# Patient Record
Sex: FEMALE | Race: WHITE | Employment: UNEMPLOYED | URBAN - METROPOLITAN AREA
[De-identification: names, ages, dates, MRNs, and addresses within clinical notes are randomized per-mention and may not be internally consistent; named-entity substitution may affect disease eponyms.]

---

## 2018-02-13 ENCOUNTER — OFFICE VISIT (OUTPATIENT)
Dept: FAMILY MEDICINE | Facility: CLINIC | Age: 28
End: 2018-02-13
Payer: COMMERCIAL

## 2018-02-13 VITALS
SYSTOLIC BLOOD PRESSURE: 106 MMHG | DIASTOLIC BLOOD PRESSURE: 60 MMHG | HEART RATE: 76 BPM | HEIGHT: 63 IN | BODY MASS INDEX: 22.08 KG/M2 | WEIGHT: 124.6 LBS | TEMPERATURE: 99.2 F

## 2018-02-13 DIAGNOSIS — R07.0 THROAT PAIN: Primary | ICD-10-CM

## 2018-02-13 DIAGNOSIS — R52 ACHES: ICD-10-CM

## 2018-02-13 LAB
DEPRECATED S PYO AG THROAT QL EIA: NORMAL
FLUAV+FLUBV AG SPEC QL: NEGATIVE
FLUAV+FLUBV AG SPEC QL: NEGATIVE
SPECIMEN SOURCE: NORMAL
SPECIMEN SOURCE: NORMAL

## 2018-02-13 PROCEDURE — 87081 CULTURE SCREEN ONLY: CPT | Performed by: FAMILY MEDICINE

## 2018-02-13 PROCEDURE — 87804 INFLUENZA ASSAY W/OPTIC: CPT | Performed by: FAMILY MEDICINE

## 2018-02-13 PROCEDURE — 99203 OFFICE O/P NEW LOW 30 MIN: CPT | Performed by: FAMILY MEDICINE

## 2018-02-13 PROCEDURE — 87880 STREP A ASSAY W/OPTIC: CPT | Performed by: FAMILY MEDICINE

## 2018-02-13 NOTE — LETTER
[facility]  [id number] [place] [address]  Phone: [phone]      To Whom It May Concern,    Marlee Calvillo was seen in clinic today due to illness.  I recommend that she not work today, tomorrow or Thursday.    Sincerely,  Sandra Augustine MD

## 2018-02-13 NOTE — MR AVS SNAPSHOT
"              After Visit Summary   2018    Marlee Calvillo    MRN: [id number]           Patient Information     Date Of Birth          1990        Visit Information        Provider Department      2018 12:20 PM Sandra Augustine MD [facility]        Today's Diagnoses     Throat pain    -  1    Aches           Follow-ups after your visit        Who to contact     Normal or non-critical lab and imaging results will be communicated to you by MyChart, letter or phone within 4 business days after the clinic has received the results. If you do not hear from us within 7 days, please contact the clinic through MyChart or phone. If you have a critical or abnormal lab result, we will notify you by phone as soon as possible.  Submit refill requests through [employer] or call your pharmacy and they will forward the refill request to us. Please allow 3 business days for your refill to be completed.          If you need to speak with a  for additional information , please call: [phone]             Additional Information About Your Visit        [employer] Information     [employer] lets you send messages to your doctor, view your test results, renew your prescriptions, schedule appointments and more. To sign up, go to [email or web address] . Click on \"Log in\" on the left side of the screen, which will take you to the Welcome page. Then click on \"Sign up Now\" on the right side of the page.     You will be asked to enter the access code listed below, as well as some personal information. Please follow the directions to create your username and password.     Your access code is: [email or web address]  Expires: 5/15/2018 10:11 AM     Your access code will  in 90 days. If you need help or a new code, please call your [place] clinic or [phone].        Care EveryWhere ID     This is your Care EveryWhere ID. This could be used by other organizations to access your [place] medical " "records  [id number]        Your Vitals Were     Pulse Temperature Height BMI (Body Mass Index)          76 99.2  F (37.3  C) (Tympanic) 5' 3\" (1.6 m) 22.07 kg/m2         Blood Pressure from Last 3 Encounters:   02/13/18 106/60    Weight from Last 3 Encounters:   02/13/18 124 lb 9.6 oz (56.5 kg)              We Performed the Following     Beta strep group A culture     Influenza A/B antigen     Strep, Rapid Screen        Primary Care Provider Fax #    Physician No Ref-Primary [phone]       No address on file        Equal Access to Services     [facility]: Hadii noah Lopez, wababatunde owens, giancarlo porter, magnus bowers . So [facility] [phone].    ATENCIÓN: Si habla español, tiene a alvarez disposición servicios gratuitos de asistencia lingüística. Llame al [phone].    We comply with applicable federal civil rights laws and [place] laws. We do not discriminate on the basis of race, color, national origin, age, disability, sex, sexual orientation, or gender identity.            Thank you!     Thank you for choosing [facility]  for your care. Our goal is always to provide you with excellent care. Hearing back from our patients is one way we can continue to improve our services. Please take a few minutes to complete the written survey that you may receive in the mail after your visit with us. Thank you!             Your Updated Medication List - Protect others around you: Learn how to safely use, store and throw away your medicines at www.disposemymeds.org.      Notice  As of 2/13/2018 11:59 PM    You have not been prescribed any medications.      "

## 2018-02-13 NOTE — LETTER
February 14, 2018        Marlee Calvillo  [address] [id number]        The results of your 24 hour throat culture were negative.  Please contact your clinic if you have any questions or concerns.            Sincerely,        Sandra Augustine MD

## 2018-02-13 NOTE — PROGRESS NOTES
"SUBJECTIVE:                                                    Marlee Calvillo is a 27 year old female who presents to clinic today for the following health issues:             Symptoms: cc Present Absent Comment   Fever/Chills  x  Chills but does not check her temp   Fatigue  x     Headache  x     Muscle or Body  Aches   x    Eye Irritation  x  Watering and itching   Sneezing   x    Nasal Shyam/Drg  x     Sinus Pressure/Pain   x    Dental pain   x    Sore Throat x      Swollen Glands  x     Ear Pain/Fullness  x  Crackly    Cough   x    Wheeze   x    Chest Discomfort   x    Shortness of breath   x    Abdominal pain   x    Emesis    x    Diarrhea   x    Other   x      Symptom duration:  started 3 days ago   Symptom severity:  moderate   Treatments tried:  naproxen taken last night   Contacts:  yes         Problem list and histories reviewed & adjusted, as indicated.  Additional history:         ROS:  Constitutional, HEENT, cardiovascular, pulmonary, gi and gu systems are negative, except as otherwise noted.    OBJECTIVE:                                                    /60  Pulse 76  Temp 99.2  F (37.3  C) (Tympanic)  Ht 5' 3\" (1.6 m)  Wt 124 lb 9.6 oz (56.5 kg)  BMI 22.07 kg/m2 Body mass index is 22.07 kg/(m^2).   GENERAL: healthy, alert and no distress  EYES: Eyes grossly normal to inspection, extraocular movements - intact, and PERRL  HENT: ear canals- normal; TMs- normal; Nose- normal; Mouth- no ulcers, no lesions, minimal erythema in posterior oropharynx.  NO exudate or palatal petechiae  NECK: amild anterior cervical adenopathy and tenderness, no asymmetry, no masses, no stiffness; thyroid- normal to palpation  RESP: lungs clear to auscultation - no rales, no rhonchi, no wheezes  CV: regular rates and rhythm, normal S1 S2, no S3 or S4 and no murmur, no click or rub -  ABDOMEN: soft, no tenderness, no  hepatosplenomegaly, no masses, normal bowel sounds  MS: extremities- no gross deformities noted, no " edema  SKIN: no suspicious lesions, no rashes       Rapid strep NEG  Influenza NEG    ASSESSMENT/PLAN:                                                      ASSESSMENT/PLAN:    (R07.0) Throat pain  (primary encounter diagnosis)  Comment: viral process most likely  Plan: Strep, Rapid Screen, Beta strep group A         culture, Influenza A/B antigen    (R52) Aches  Comment: viral process.  Pt concerned about influenza and requested testing  Plan: Influenza A/B antigen       Flu test negative.      F/U in clinic/UC/ER if issues arise    Discussed this is likely viral process.  Best for her to stay at home and rest.  Note written for work.    Assessment and plan reviewed. Questions answered      Sandra Augustine MD  [facility]

## 2018-02-14 LAB
BACTERIA SPEC CULT: NORMAL
SPECIMEN SOURCE: NORMAL

## 2018-05-14 ENCOUNTER — OFFICE VISIT (OUTPATIENT)
Dept: FAMILY MEDICINE | Facility: CLINIC | Age: 28
End: 2018-05-14
Payer: COMMERCIAL

## 2018-05-14 VITALS
OXYGEN SATURATION: 98 % | WEIGHT: 152 LBS | SYSTOLIC BLOOD PRESSURE: 124 MMHG | BODY MASS INDEX: 26.93 KG/M2 | DIASTOLIC BLOOD PRESSURE: 85 MMHG

## 2018-05-14 DIAGNOSIS — R29.810 FACIAL DROOP: Primary | ICD-10-CM

## 2018-05-14 PROCEDURE — 99213 OFFICE O/P EST LOW 20 MIN: CPT | Performed by: NURSE PRACTITIONER

## 2018-05-14 NOTE — MR AVS SNAPSHOT
"              After Visit Summary   2018    Marlee Calvillo    MRN: [id number]           Patient Information     Date Of Birth          1990        Visit Information        Provider Department      2018 10:00 AM Jannet Shelby NP [facility]        Today's Diagnoses     Facial droop    -  1      Care Instructions    Pt told to go directly to ER for evaluation. Pt signed AMA against EMS transport.           Follow-ups after your visit        Follow-up notes from your care team     Return if symptoms worsen or fail to improve.      Who to contact     Normal or non-critical lab and imaging results will be communicated to you by [employer], letter or phone within 4 business days after the clinic has received the results. If you do not hear from us within 7 days, please contact the clinic through [employer] or phone. If you have a critical or abnormal lab result, we will notify you by phone as soon as possible.  Submit refill requests through [employer] or call your pharmacy and they will forward the refill request to us. Please allow 3 business days for your refill to be completed.          If you need to speak with a  for additional information , please call: [phone]             Additional Information About Your Visit        [employer] Information     [employer] lets you send messages to your doctor, view your test results, renew your prescriptions, schedule appointments and more. To sign up, go to [email or web address] . Click on \"Log in\" on the left side of the screen, which will take you to the Welcome page. Then click on \"Sign up Now\" on the right side of the page.     You will be asked to enter the access code listed below, as well as some personal information. Please follow the directions to create your username and password.     Your access code is: [email or web address]  Expires: 2018  1:02 PM     Your access code will  in 90 days. If you need help or a new code, please " call your [place] clinic or [phone].        Care EveryWhere ID     This is your Care EveryWhere ID. This could be used by other organizations to access your [place] medical records  [id number]        Your Vitals Were     Pulse Oximetry BMI (Body Mass Index)                98% 26.93 kg/m2           Blood Pressure from Last 3 Encounters:   05/14/18 124/85   02/13/18 106/60    Weight from Last 3 Encounters:   05/14/18 152 lb (68.9 kg)   02/13/18 124 lb 9.6 oz (56.5 kg)              Today, you had the following     No orders found for display       Primary Care Provider Fax #    Physician No Ref-Primary [phone]       No address on file        Equal Access to Services     [employer] [facility] : Otilio Lopez, wababatunde owens, qaizabel kaalmada german, magnus bowers . So [facility] [phone].    ATENCIÓN: Si habla español, tiene a alvarez disposición servicios gratuitos de asistencia lingüística. Llame al [phone].    We comply with applicable federal civil rights laws and [place] laws. We do not discriminate on the basis of race, color, national origin, age, disability, sex, sexual orientation, or gender identity.            Thank you!     Thank you for choosing [facility]  for your care. Our goal is always to provide you with excellent care. Hearing back from our patients is one way we can continue to improve our services. Please take a few minutes to complete the written survey that you may receive in the mail after your visit with us. Thank you!             Your Updated Medication List - Protect others around you: Learn how to safely use, store and throw away your medicines at www.disposemymeds.org.      Notice  As of 5/14/2018 11:59 PM    You have not been prescribed any medications.

## 2018-05-14 NOTE — NURSING NOTE
Chief Complaint   Patient presents with     Facial Droop     Patient walked into clinic with a facial droop. RN's triaged patient and obtained vitals. I did not see patient. Melissa Morris MA

## 2018-05-15 NOTE — PROGRESS NOTES
Marlee walked into clinic with R sided facial droop.  Never had this before.  No hx blood clots.  No chest pain or SOB. Pt very anxious.  No arm drift, Not able to close eye or smile on R.  No current illness, reports R ear pain but no infection present. No new medications, no recent vaccinations.  Discussed Bell's Palsy vs stroke.  Advised pt to go to ER via EMS transport for further evaluation; pt declining EMS transport.  Pt go ride to go to hospital, signed AMA.  VSS.  DIAN Tenorio, FNP-BC

## 2018-05-18 ENCOUNTER — OFFICE VISIT (OUTPATIENT)
Dept: FAMILY MEDICINE | Facility: CLINIC | Age: 28
End: 2018-05-18
Payer: COMMERCIAL

## 2018-05-18 VITALS
WEIGHT: 127.6 LBS | RESPIRATION RATE: 16 BRPM | BODY MASS INDEX: 21.78 KG/M2 | TEMPERATURE: 98.1 F | HEART RATE: 114 BPM | OXYGEN SATURATION: 99 % | DIASTOLIC BLOOD PRESSURE: 80 MMHG | HEIGHT: 64 IN | SYSTOLIC BLOOD PRESSURE: 118 MMHG

## 2018-05-18 DIAGNOSIS — Z09 HOSPITAL DISCHARGE FOLLOW-UP: Primary | ICD-10-CM

## 2018-05-18 DIAGNOSIS — G51.0 BELL'S PALSY: ICD-10-CM

## 2018-05-18 PROCEDURE — 99214 OFFICE O/P EST MOD 30 MIN: CPT | Performed by: NURSE PRACTITIONER

## 2018-05-18 RX ORDER — PREDNISONE 20 MG/1
TABLET ORAL
Qty: 11 TABLET | Refills: 0 | Status: SHIPPED | OUTPATIENT
Start: 2018-05-18

## 2018-05-18 RX ORDER — PREDNISONE 20 MG/1
TABLET ORAL
COMMUNITY
Start: 2018-05-14 | End: 2018-05-18

## 2018-05-18 RX ORDER — VALACYCLOVIR HYDROCHLORIDE 500 MG/1
1000 TABLET, FILM COATED ORAL
COMMUNITY
Start: 2018-05-14 | End: 2018-05-21

## 2018-05-18 NOTE — PROGRESS NOTES
"  SUBJECTIVE:   Marlee Calvillo is a 27 year old female who presents to clinic today for the following health issues:      ED/UC Followup:    Facility:  [facility]  Date of visit: 5/14/18  Reason for visit: facial droop  Current Status: discharge-bells palsy-given valtrex, prednisone       Problem list and histories reviewed & adjusted, as indicated.  Additional history: as documented    There is no problem list on file for this patient.    Past Surgical History:   Procedure Laterality Date     C/SECTION, CLASSICAL  2012 & 2006       Social History   Substance Use Topics     Smoking status: Former Smoker     Smokeless tobacco: Never Used      Comment: vape only     Alcohol use No     History reviewed. No pertinent family history.      Current Outpatient Prescriptions   Medication Sig Dispense Refill     predniSONE (DELTASONE) 20 MG tablet 2 tabs (40 mg) daily x 3 days, 1 tab (20 mg) daily x 3 days, then 1/2 tab (10 mg) x 3 days. 11 tablet 0     valACYclovir (VALTREX) 500 MG tablet Take 1,000 mg by mouth       No Known Allergies  BP Readings from Last 3 Encounters:   05/18/18 118/80   05/14/18 124/85   02/13/18 106/60    Wt Readings from Last 3 Encounters:   05/18/18 127 lb 9.6 oz (57.9 kg)   05/14/18 152 lb (68.9 kg)   02/13/18 124 lb 9.6 oz (56.5 kg)                  Labs reviewed in EPIC    Reviewed and updated as needed this visit by clinical staff  Tobacco  Allergies  Meds  Med Hx  Surg Hx  Fam Hx  Soc Hx      Reviewed and updated as needed this visit by Provider         ROS:  Constitutional, HEENT, cardiovascular, pulmonary, GI, , musculoskeletal, neuro, skin, endocrine and psych systems are negative, except as otherwise noted.    OBJECTIVE:     /80  Pulse 114  Temp 98.1  F (36.7  C) (Oral)  Resp 16  Ht 5' 4\" (1.626 m)  Wt 127 lb 9.6 oz (57.9 kg)  LMP 04/18/2018 (Approximate)  SpO2 99%  Breastfeeding? No  BMI 21.9 kg/m2  Body mass index is 21.9 kg/(m^2).  GENERAL: healthy, alert and no " distress  EYES: Eyes grossly normal to inspection, PERRL and conjunctivae and sclerae normal  RESP: lungs clear to auscultation - no rales, rhonchi or wheezes  CV: regular rate and rhythm, normal S1 S2, no S3 or S4, no murmur, click or rub, no peripheral edema and peripheral pulses strong  NEURO: Normal strength and tone, mentation intact POSITIVE still not able to fully close R eye or smile. Does not feel she is ready to go back to work yet; until further improvement is seen.     Diagnostic Test Results:  none     ASSESSMENT/PLAN:         ICD-10-CM    1. Hospital discharge follow-up Z09 predniSONE (DELTASONE) 20 MG tablet   2. Bell's palsy G51.0 predniSONE (DELTASONE) 20 MG tablet    pt took prednisone wrong, now is out of pills for taper       See Patient Instructions    Jannet Shelby, P  [facility]

## 2018-05-18 NOTE — NURSING NOTE
"Chief Complaint   Patient presents with     RECHECK       Initial /80  Pulse 114  Temp 98.1  F (36.7  C) (Oral)  Resp 16  Ht 5' 4\" (1.626 m)  Wt 127 lb 9.6 oz (57.9 kg)  LMP 04/18/2018 (Approximate)  SpO2 99%  Breastfeeding? No  BMI 21.9 kg/m2 Estimated body mass index is 21.9 kg/(m^2) as calculated from the following:    Height as of this encounter: 5' 4\" (1.626 m).    Weight as of this encounter: 127 lb 9.6 oz (57.9 kg).  Medication Reconciliation: complete     Melissa Morris MA  "

## 2018-05-18 NOTE — MR AVS SNAPSHOT
After Visit Summary   5/18/2018    Marlee Calvillo    MRN: [id number]           Patient Information     Date Of Birth          1990        Visit Information        Provider Department      5/18/2018 1:40 PM Jannet Shelby NP [facility]'s Diagnoses     Bell's palsy    -  1    Hospital discharge follow-up          Care Instructions      Lucas s Palsy    Bell's Palsy is a problem involving the nerve that controls the muscles on one side of the face.  The cause is unknown, but may be related to inflammation of the nerve. Symptoms usually appear only on the affected side. They may include:    Inability to close the eyelid    Tearing of the eye    Facial drooping    Drooling    Numbness or pain    Changes in taste    Sound sensitivity  Damage to the eye can be a serious problem. The inability to blink can cause the eye to dry out. An ulcer (sore) can then form on the cornea. Also, not blinking means that the eye has no protection from dirt and dust particles.  Treatment involves protecting and moistening the eye. Medicines may also help.  Most persons recover completely within 3 to 6 months. However, the condition sometimes returns months or years later.  Home care    Get plenty of rest and eat a healthy diet to help yourself recover.    Use Artificial Tears frequently during the day and at bedtime to prevent drying. These drops are available without prescription at your drug store.    Wear protective glasses especially when outside to protect from flying debris. Use sunglasses when outdoors.    Tape the eyelid closed at bedtime with a paper tape (available at your pharmacy). This has a very mild adhesive to avoid injury to the lid. This will protect your eye from injury while you sleep.    Sometimes medicines are prescribed to reduce inflammation or treat specific viral infections of the nerve. If medicines are prescribed, take them exactly as directed. Usually there is a  10-day course of medication that is started as soon as possible. Taking this medicine as prescribed will help with a full recovery.    Use low heat, for example from a heating pad, on the affected area. This can help reduce pain and swelling.    If you are experiencing sever pain, contact your health care provider.  Follow-up care  Follow up with your healthcare provider as advised. If you referred to a specialist, make that appointment promptly.  When to seek medical advice  Call your healthcare provider if any of the following occur:    Severe eye redness    Eye pain    Thick drainage from the eye    Change in vision (such as double vision or losing vision)    Fever over 100.4 F (38 C) or as directed by your healthcare provider    Headache, neck pain, weakness, trouble speaking or walking, or other unexplained symptoms  Date Last Reviewed: 8/23/2015 2000-2017 [employer]. [address] [place] [address]. All rights reserved. This information is not intended as a substitute for professional medical care. Always follow your healthcare professional's instructions.                Follow-ups after your visit        Follow-up notes from your care team     Return if symptoms worsen or fail to improve.      Your next 10 appointments already scheduled     May 21, 2018 10:00 AM CDT   PHYSICAL with Jannet Shelby NP   [facility] ([facility])    [address] [place] [address] [phone]              Who to contact     Normal or non-critical lab and imaging results will be communicated to you by MyChart, letter or phone within 4 business days after the clinic has received the results. If you do not hear from us within 7 days, please contact the clinic through MyChart or phone. If you have a critical or abnormal lab result, we will notify you by phone as soon as possible.  Submit refill requests through [employer] or call your pharmacy and they will  "forward the refill request to us. Please allow 3 business days for your refill to be completed.          If you need to speak with a  for additional information , please call: [phone]             Additional Information About Your Visit        [employer] Information     [employer] gives you secure access to your electronic health record. If you see a primary care provider, you can also send messages to your care team and make appointments. If you have questions, please call your primary care clinic.  If you do not have a primary care provider, please call [phone] and they will assist you.        Care EveryWhere ID     This is your Care EveryWhere ID. This could be used by other organizations to access your [place] medical records  [id number]        Your Vitals Were     Pulse Temperature Respirations Height Last Period Pulse Oximetry    114 98.1  F (36.7  C) (Oral) 16 5' 4\" (1.626 m) 04/18/2018 (Approximate) 99%    Breastfeeding? BMI (Body Mass Index)                No 21.9 kg/m2           Blood Pressure from Last 3 Encounters:   05/18/18 118/80   05/14/18 124/85   02/13/18 106/60    Weight from Last 3 Encounters:   05/18/18 127 lb 9.6 oz (57.9 kg)   05/14/18 152 lb (68.9 kg)   02/13/18 124 lb 9.6 oz (56.5 kg)              Today, you had the following     No orders found for display         Today's Medication Changes          These changes are accurate as of 5/18/18  1:45 PM.  If you have any questions, ask your nurse or doctor.               Start taking these medicines.        Dose/Directions    predniSONE 20 MG tablet   Commonly known as:  DELTASONE   Used for:  Bell's palsy, Hospital discharge follow-up   Started by:  Jannet Shelby NP        2 tabs (40 mg) daily x 3 days, 1 tab (20 mg) daily x 3 days, then 1/2 tab (10 mg) x 3 days.   Quantity:  11 tablet   Refills:  0            Where to get your medicines      These medications were sent to [facility] [address] [place], [place] " - [address] AT [address], [facility] [address]     Phone:  [phone]     predniSONE 20 MG tablet                Primary Care Provider Fax #    Physician No Ref-Primary [phone]       No address on file        Equal Access to Services     [employer] : Otilio warren ashao Soomaali, waaxda luqadaha, qaybta kaalmada adeegyada, waxnell spencer donaldanthony santosrussellbrent hollins. So [facility] [phone].    ATENCIÓN: Si habla español, tiene a alvarez disposición servicios gratuitos de asistencia lingüística. Ll[facility] [phone].    We comply with applicable federal civil rights laws and [place] laws. We do not discriminate on the basis of race, color, national origin, age, disability, sex, sexual orientation, or gender identity.            Thank you!     Thank you for choosing [facility]  for your care. Our goal is always to provide you with excellent care. Hearing back from our patients is one way we can continue to improve our services. Please take a few minutes to complete the written survey that you may receive in the mail after your visit with us. Thank you!             Your Updated Medication List - Protect others around you: Learn how to safely use, store and throw away your medicines at www.disposemymeds.org.          This list is accurate as of 5/18/18  1:45 PM.  Always use your most recent med list.                   Brand Name Dispense Instructions for use Diagnosis    predniSONE 20 MG tablet    DELTASONE    11 tablet    2 tabs (40 mg) daily x 3 days, 1 tab (20 mg) daily x 3 days, then 1/2 tab (10 mg) x 3 days.    Bell's palsy, Hospital discharge follow-up       valACYclovir 500 MG tablet    VALTREX     Take 1,000 mg by mouth

## 2018-05-18 NOTE — LETTER
[facility]  [address] [facility]  [place] [address]  [phone]          May 18, 2018    RE:  Marlee Calvillo                                                                                                                                                       [address]   [facility] [address]            To whom it may concern:    Marlee Calvillo is under my professional care, she is to remain off work through 5/25/18, and may return 5/26/18 unless directed otherwise.     Sincerely,        Jannet Shelby RN, CNP

## 2018-05-22 ENCOUNTER — HEALTH MAINTENANCE LETTER (OUTPATIENT)
Age: 28
End: 2018-05-22

## 2018-07-17 ENCOUNTER — TELEPHONE (OUTPATIENT)
Dept: FAMILY MEDICINE | Facility: CLINIC | Age: 28
End: 2018-07-17

## 2018-07-17 NOTE — TELEPHONE ENCOUNTER
Panel Management Review    Summary:    Patient is due/failing the following:   PAP and PHYSICAL    Type of outreach:    Sent [place] [place] message.    Questions for provider review:    None                                                                                                                                    Melissa Morris MA       Chart routed to Care Team .

## 2018-07-17 NOTE — LETTER
August 21, 2018      Marlee Calvillo  [address]   [facility] [address]        Dear Marlee,     We have tried to contact you by phone several times.    This is a reminder letter.     In order to ensure we are providing the best quality care, we have reviewed your chart and see that you are due for:    1.   Your next office visit is due for physical and pap    For fasting labs please fast for at least 10 hours. You can still take your medications and have water.    We greatly appreciate the opportunity to serve you.  Thank you for trusting us with your health care.    If you are now being seen elsewhere please let us know and we will remove you from the list.    Sincerely,    The [place] Team

## 2018-08-07 NOTE — TELEPHONE ENCOUNTER
Left message for patient to call back pod 1 hotline([phone])    Patient is due/failing the following:   PAP and PHYSICAL

## 2018-08-16 NOTE — TELEPHONE ENCOUNTER
Left message for patient to call back pod 1 hotline([phone])    Patient is due/failing the following:   PAP and PHYSICAL

## 2018-11-01 ENCOUNTER — TELEPHONE (OUTPATIENT)
Dept: FAMILY MEDICINE | Facility: CLINIC | Age: 28
End: 2018-11-01

## 2018-11-01 NOTE — LETTER
December 27, 2018      Marlee Calvillo  [address]   [facility] [address]        Dear Marlee,     We have tried to contact you by phone and mychart several times.     This is a reminder letter.     In order to ensure we are providing the best quality care, we have reviewed your chart and see that you are due for:    1.   Your next office visit is due for a physical and pap     For fasting labs please fast for at least 10 hours. You can still take your medications and have water.    We greatly appreciate the opportunity to serve you.  Thank you for trusting us with your health care.    If you are now being seen elsewhere please let us know and we will remove you from the list.    Sincerely,    The [place] Team

## 2018-11-01 NOTE — TELEPHONE ENCOUNTER
Panel Management Review    Summary:    Patient is due/failing the following:   Physical and pap    Type of outreach:    Sent [employer] message.    Questions for provider review:    None                                                                                                                                    Melissa Morris MA       Chart routed to Care Team .

## 2018-11-19 NOTE — TELEPHONE ENCOUNTER
Left message for patient to call back pod 1 hotline([phone])    Patient is due/failing the following:   Physical and pap

## 2018-12-21 NOTE — TELEPHONE ENCOUNTER
Left message for patient to call back pod 1 hotline([phone])    Patient is due/failing the following:   Physical and pap

## 2019-07-28 ENCOUNTER — OFFICE VISIT (OUTPATIENT)
Dept: URGENT CARE | Facility: URGENT CARE | Age: 29
End: 2019-07-28
Payer: MEDICAID

## 2019-07-28 VITALS
WEIGHT: 163.5 LBS | BODY MASS INDEX: 28.06 KG/M2 | OXYGEN SATURATION: 99 % | TEMPERATURE: 98.3 F | SYSTOLIC BLOOD PRESSURE: 111 MMHG | RESPIRATION RATE: 16 BRPM | HEART RATE: 110 BPM | DIASTOLIC BLOOD PRESSURE: 72 MMHG

## 2019-07-28 DIAGNOSIS — M54.5 ACUTE BILATERAL LOW BACK PAIN, WITH SCIATICA PRESENCE UNSPECIFIED: ICD-10-CM

## 2019-07-28 DIAGNOSIS — R82.90 NONSPECIFIC FINDING ON EXAMINATION OF URINE: ICD-10-CM

## 2019-07-28 DIAGNOSIS — R30.0 DYSURIA: Primary | ICD-10-CM

## 2019-07-28 DIAGNOSIS — R10.84 ABDOMINAL PAIN, GENERALIZED: ICD-10-CM

## 2019-07-28 LAB
ALBUMIN UR-MCNC: NEGATIVE MG/DL
APPEARANCE UR: CLEAR
BACTERIA #/AREA URNS HPF: ABNORMAL /HPF
BILIRUB UR QL STRIP: NEGATIVE
COLOR UR AUTO: YELLOW
GLUCOSE UR STRIP-MCNC: NEGATIVE MG/DL
HGB UR QL STRIP: ABNORMAL
KETONES UR STRIP-MCNC: NEGATIVE MG/DL
LEUKOCYTE ESTERASE UR QL STRIP: ABNORMAL
NITRATE UR QL: NEGATIVE
PH UR STRIP: 8 PH (ref 5–7)
RBC #/AREA URNS AUTO: ABNORMAL /HPF
SOURCE: ABNORMAL
SP GR UR STRIP: 1.01 (ref 1–1.03)
UROBILINOGEN UR STRIP-ACNC: 0.2 EU/DL (ref 0.2–1)
WBC #/AREA URNS AUTO: ABNORMAL /HPF
WBC CLUMPS #/AREA URNS HPF: PRESENT /HPF

## 2019-07-28 PROCEDURE — 81001 URINALYSIS AUTO W/SCOPE: CPT | Performed by: NURSE PRACTITIONER

## 2019-07-28 PROCEDURE — 99213 OFFICE O/P EST LOW 20 MIN: CPT | Performed by: NURSE PRACTITIONER

## 2019-07-28 PROCEDURE — 87088 URINE BACTERIA CULTURE: CPT | Performed by: NURSE PRACTITIONER

## 2019-07-28 PROCEDURE — 87086 URINE CULTURE/COLONY COUNT: CPT | Performed by: NURSE PRACTITIONER

## 2019-07-28 PROCEDURE — 87186 SC STD MICRODIL/AGAR DIL: CPT | Performed by: NURSE PRACTITIONER

## 2019-07-28 ASSESSMENT — ENCOUNTER SYMPTOMS
ABDOMINAL PAIN: 1
BACK PAIN: 1

## 2019-07-29 NOTE — PROGRESS NOTES
HPI  Marlee Calvillo 28 year old presents with CHIEF COMPLAINT of lower abdominal and bilateral back pain. She is 39 weeks pregnant and very uncomfortable. She is unsure if this is labor as she had her two previous children via .   Review of Systems   Gastrointestinal: Positive for abdominal pain.   Musculoskeletal: Positive for back pain.         Physical Exam   Constitutional:   /72 (BP Location: Left arm, Cuff Size: Adult Regular)   Pulse 110   Temp 98.3  F (36.8  C) (Oral)   Resp 16   Wt 74.2 kg (163 lb 8 oz)   SpO2 99%   BMI 28.06 kg/m    Uncomfortable appearing adult female with back and lower abdominal cramping. She is 39 weeks pregnant.      Assessment:  1. Dysuria    2. Acute bilateral low back pain, with sciatica presence unspecified    3. Abdominal pain, generalized        Plan:  Orders Placed This Encounter     *UA reflex to Microscopic and Culture ([place] [facility] (except [place] and [place])         It appears this woman is starting to go into labor. She was advised to present to the hospital where she is scheduled to deliver ([facility]). She agrees to this plan of care.     Aimee Garcia, DNP, APRN, CNP

## 2019-07-31 LAB
BACTERIA SPEC CULT: ABNORMAL
BACTERIA SPEC CULT: ABNORMAL
SPECIMEN SOURCE: ABNORMAL

## 2020-03-11 ENCOUNTER — HEALTH MAINTENANCE LETTER (OUTPATIENT)
Age: 30
End: 2020-03-11